# Patient Record
Sex: MALE | Race: WHITE | NOT HISPANIC OR LATINO | Employment: OTHER | URBAN - NONMETROPOLITAN AREA
[De-identification: names, ages, dates, MRNs, and addresses within clinical notes are randomized per-mention and may not be internally consistent; named-entity substitution may affect disease eponyms.]

---

## 2023-09-15 PROBLEM — E78.5 HYPERLIPIDEMIA: Status: ACTIVE | Noted: 2023-09-15

## 2023-09-15 PROBLEM — E66.9 CLASS 1 OBESITY WITH BODY MASS INDEX (BMI) OF 32.0 TO 32.9 IN ADULT: Status: ACTIVE | Noted: 2023-09-15

## 2023-09-15 PROBLEM — I10 ESSENTIAL HYPERTENSION, BENIGN: Status: ACTIVE | Noted: 2023-09-15

## 2023-09-15 PROBLEM — E66.811 CLASS 1 OBESITY WITH BODY MASS INDEX (BMI) OF 32.0 TO 32.9 IN ADULT: Status: ACTIVE | Noted: 2023-09-15

## 2023-09-15 PROBLEM — E11.9 DIABETES MELLITUS (MULTI): Status: ACTIVE | Noted: 2023-09-15

## 2023-09-15 PROBLEM — I25.10 ARTERIOSCLEROTIC CARDIOVASCULAR DISEASE: Status: ACTIVE | Noted: 2023-09-15

## 2023-09-15 PROBLEM — E66.9 CLASS 1 OBESITY WITH BODY MASS INDEX (BMI) OF 34.0 TO 34.9 IN ADULT: Status: ACTIVE | Noted: 2023-09-15

## 2023-09-15 PROBLEM — I95.1 ORTHOSTATIC HYPOTENSION: Status: ACTIVE | Noted: 2023-09-15

## 2023-09-15 PROBLEM — I48.0 PAF (PAROXYSMAL ATRIAL FIBRILLATION) (MULTI): Status: ACTIVE | Noted: 2023-09-15

## 2023-09-15 PROBLEM — E66.811 CLASS 1 OBESITY WITH BODY MASS INDEX (BMI) OF 34.0 TO 34.9 IN ADULT: Status: ACTIVE | Noted: 2023-09-15

## 2023-09-15 RX ORDER — GUAIFENESIN 1200 MG
TABLET, EXTENDED RELEASE 12 HR ORAL AS NEEDED
COMMUNITY

## 2023-09-15 RX ORDER — HUMAN INSULIN 100 [IU]/ML
INJECTION, SOLUTION SUBCUTANEOUS
COMMUNITY
Start: 2021-11-12

## 2023-09-15 RX ORDER — INSULIN GLARGINE 100 [IU]/ML
INJECTION, SOLUTION SUBCUTANEOUS
COMMUNITY

## 2023-09-15 RX ORDER — CLOPIDOGREL BISULFATE 75 MG/1
75 TABLET ORAL DAILY
COMMUNITY

## 2023-09-15 RX ORDER — GABAPENTIN 600 MG/1
600 TABLET ORAL 4 TIMES DAILY
COMMUNITY
Start: 2021-08-25

## 2023-09-15 RX ORDER — IBUPROFEN 200 MG
TABLET ORAL AS NEEDED
COMMUNITY

## 2023-09-15 RX ORDER — MIDODRINE HYDROCHLORIDE 10 MG/1
1 TABLET ORAL 3 TIMES DAILY
COMMUNITY
Start: 2021-12-01

## 2023-09-15 RX ORDER — ATORVASTATIN CALCIUM 40 MG/1
1 TABLET, FILM COATED ORAL NIGHTLY
COMMUNITY
Start: 2021-10-20

## 2023-09-15 RX ORDER — ASPIRIN 81 MG/1
1 TABLET ORAL DAILY
COMMUNITY

## 2023-09-15 RX ORDER — INSULIN DEGLUDEC 200 U/ML
INJECTION, SOLUTION SUBCUTANEOUS
COMMUNITY

## 2023-09-15 RX ORDER — PREGABALIN 75 MG/1
1 CAPSULE ORAL NIGHTLY
COMMUNITY

## 2023-09-15 RX ORDER — AMIODARONE HYDROCHLORIDE 200 MG/1
1 TABLET ORAL DAILY
COMMUNITY
Start: 2021-11-03

## 2023-10-17 ENCOUNTER — APPOINTMENT (OUTPATIENT)
Dept: CARDIOLOGY | Facility: CLINIC | Age: 73
End: 2023-10-17
Payer: MEDICARE

## 2023-11-13 ENCOUNTER — APPOINTMENT (OUTPATIENT)
Dept: CARDIOLOGY | Facility: CLINIC | Age: 73
End: 2023-11-13
Payer: MEDICARE

## 2023-11-21 ENCOUNTER — OFFICE VISIT (OUTPATIENT)
Dept: CARDIOLOGY | Facility: CLINIC | Age: 73
End: 2023-11-21
Payer: MEDICARE

## 2023-11-21 VITALS
BODY MASS INDEX: 33.05 KG/M2 | HEART RATE: 76 BPM | SYSTOLIC BLOOD PRESSURE: 136 MMHG | DIASTOLIC BLOOD PRESSURE: 80 MMHG | HEIGHT: 72 IN | WEIGHT: 244 LBS

## 2023-11-21 DIAGNOSIS — Z79.4 TYPE 2 DIABETES MELLITUS WITH DIABETIC POLYNEUROPATHY, WITH LONG-TERM CURRENT USE OF INSULIN (MULTI): ICD-10-CM

## 2023-11-21 DIAGNOSIS — E78.2 MIXED HYPERLIPIDEMIA: ICD-10-CM

## 2023-11-21 DIAGNOSIS — I48.0 PAF (PAROXYSMAL ATRIAL FIBRILLATION) (MULTI): ICD-10-CM

## 2023-11-21 DIAGNOSIS — E66.09 CLASS 1 OBESITY DUE TO EXCESS CALORIES WITH SERIOUS COMORBIDITY AND BODY MASS INDEX (BMI) OF 34.0 TO 34.9 IN ADULT: ICD-10-CM

## 2023-11-21 DIAGNOSIS — Z79.899 HIGH RISK MEDICATION USE: Primary | ICD-10-CM

## 2023-11-21 DIAGNOSIS — E11.42 TYPE 2 DIABETES MELLITUS WITH DIABETIC POLYNEUROPATHY, WITH LONG-TERM CURRENT USE OF INSULIN (MULTI): ICD-10-CM

## 2023-11-21 DIAGNOSIS — Z95.2 AORTIC VALVE REPLACED: ICD-10-CM

## 2023-11-21 DIAGNOSIS — I25.10 ARTERIOSCLEROTIC CARDIOVASCULAR DISEASE: ICD-10-CM

## 2023-11-21 PROCEDURE — 1159F MED LIST DOCD IN RCRD: CPT | Performed by: NURSE PRACTITIONER

## 2023-11-21 PROCEDURE — 3075F SYST BP GE 130 - 139MM HG: CPT | Performed by: NURSE PRACTITIONER

## 2023-11-21 PROCEDURE — 1160F RVW MEDS BY RX/DR IN RCRD: CPT | Performed by: NURSE PRACTITIONER

## 2023-11-21 PROCEDURE — 3008F BODY MASS INDEX DOCD: CPT | Performed by: NURSE PRACTITIONER

## 2023-11-21 PROCEDURE — 1036F TOBACCO NON-USER: CPT | Performed by: NURSE PRACTITIONER

## 2023-11-21 PROCEDURE — 3079F DIAST BP 80-89 MM HG: CPT | Performed by: NURSE PRACTITIONER

## 2023-11-21 PROCEDURE — 93000 ELECTROCARDIOGRAM COMPLETE: CPT | Performed by: NURSE PRACTITIONER

## 2023-11-21 PROCEDURE — 99213 OFFICE O/P EST LOW 20 MIN: CPT | Performed by: NURSE PRACTITIONER

## 2023-11-21 NOTE — PATIENT INSTRUCTIONS
Please bring all medicines, vitamins, and herbal supplements with you when you come to the office.    Prescriptions will not be filled unless you are compliant with your follow up appointments or have a follow up appointment scheduled as per instruction of your physician. Refills should be requested at the time of your visit.    Fall Prevention Education Given  EKG done in office today    PLAN:   Through informed decision making process incorporating patients unique circumstances, the following treatment plan will be initiated:    1.  Prescription drug management of cardiovascular medication for efficacy, adherence to treatment, side effect assessment and polypharmacy. Current treatment clinically warranted and to continue without modifications.    2. Return for follow-up; in the interim, contact the office if new symptoms arise.  Dr. Tadeo 9 months

## 2023-11-22 PROBLEM — R93.1 ABNORMAL ECHOCARDIOGRAM: Status: ACTIVE | Noted: 2023-11-22

## 2023-11-22 ASSESSMENT — ENCOUNTER SYMPTOMS
NEAR-SYNCOPE: 0
PALPITATIONS: 0
DYSPNEA ON EXERTION: 0
IRREGULAR HEARTBEAT: 0
ORTHOPNEA: 0
SYNCOPE: 0
PND: 0
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
## 2023-11-22 NOTE — ASSESSMENT & PLAN NOTE
Amiodarone  Surveillance testing completed March 2023  QTc in office 524 with underlying right bundle branch block consistent with history

## 2023-11-22 NOTE — ASSESSMENT & PLAN NOTE
2007 coronary bypass graft  BRITO to LAD  RCA 10%  Circumflex 10%    Current daily activity less than 4 METS without recurrent symptoms

## 2023-11-22 NOTE — ASSESSMENT & PLAN NOTE
2007 aortic valve replacement #25 Alvarez Perimount bioprosthetic valve due to bicuspid valve.    Last evaluated October 2022 TTE with peak 35 mean 24

## 2023-11-29 ENCOUNTER — APPOINTMENT (OUTPATIENT)
Dept: CARDIOLOGY | Facility: CLINIC | Age: 73
End: 2023-11-29
Payer: MEDICARE

## 2024-01-11 ENCOUNTER — TELEPHONE (OUTPATIENT)
Dept: CARDIOLOGY | Facility: CLINIC | Age: 74
End: 2024-01-11
Payer: MEDICARE

## 2024-01-11 NOTE — TELEPHONE ENCOUNTER
Patient was due for Amiodarone testing at [facility] in 9/2023. Nothing noted. Attempted to phone patient. No answer.  Unable to leave message.

## 2024-08-07 ENCOUNTER — APPOINTMENT (OUTPATIENT)
Dept: CARDIOLOGY | Facility: CLINIC | Age: 74
End: 2024-08-07
Payer: MEDICARE

## 2024-08-29 PROBLEM — E78.5 HYPERLIPIDEMIA: Status: ACTIVE | Noted: 2024-08-29

## 2024-08-29 PROBLEM — E11.621 TYPE 2 DIABETES MELLITUS WITH FOOT ULCER (HCC): Status: ACTIVE | Noted: 2024-08-29

## 2024-08-29 PROBLEM — I25.10 ASHD (ARTERIOSCLEROTIC HEART DISEASE): Status: ACTIVE | Noted: 2024-08-29

## 2024-08-29 PROBLEM — I10 HYPERTENSION: Status: ACTIVE | Noted: 2024-08-29

## 2024-08-29 PROBLEM — L97.509 TYPE 2 DIABETES MELLITUS WITH FOOT ULCER (HCC): Status: ACTIVE | Noted: 2024-08-29

## 2024-08-29 PROBLEM — N18.30 CHRONIC KIDNEY DISEASE (CKD), ACTIVE MEDICAL MANAGEMENT WITHOUT DIALYSIS, STAGE 3 (MODERATE) (HCC): Status: ACTIVE | Noted: 2024-08-29

## 2024-08-29 PROBLEM — I48.91 ATRIAL FIBRILLATION (HCC): Status: ACTIVE | Noted: 2024-08-29

## 2024-08-29 PROBLEM — K59.00 CONSTIPATION: Status: ACTIVE | Noted: 2024-08-29

## 2024-08-29 PROBLEM — F32.A DEPRESSION: Status: ACTIVE | Noted: 2024-08-29

## 2024-08-29 RX ORDER — CLOPIDOGREL BISULFATE 75 MG/1
75 TABLET ORAL DAILY
COMMUNITY

## 2024-08-29 RX ORDER — ACETAMINOPHEN 325 MG/1
650 TABLET ORAL EVERY 4 HOURS PRN
COMMUNITY

## 2024-08-29 RX ORDER — SERTRALINE HYDROCHLORIDE 25 MG/1
25 TABLET, FILM COATED ORAL DAILY
COMMUNITY

## 2024-08-29 RX ORDER — ATORVASTATIN CALCIUM 80 MG/1
80 TABLET, FILM COATED ORAL NIGHTLY
COMMUNITY

## 2024-08-29 RX ORDER — AMIODARONE HYDROCHLORIDE 200 MG/1
200 TABLET ORAL DAILY
COMMUNITY

## 2024-08-29 RX ORDER — ASPIRIN 81 MG/1
81 TABLET, CHEWABLE ORAL DAILY
COMMUNITY

## 2024-08-29 RX ORDER — GABAPENTIN 600 MG/1
600 TABLET ORAL 2 TIMES DAILY
COMMUNITY

## 2024-08-30 ENCOUNTER — OFFICE VISIT (OUTPATIENT)
Dept: GERIATRIC MEDICINE | Age: 74
End: 2024-08-30

## 2024-08-30 DIAGNOSIS — E11.621 TYPE 2 DIABETES MELLITUS WITH FOOT ULCER, WITHOUT LONG-TERM CURRENT USE OF INSULIN (HCC): ICD-10-CM

## 2024-08-30 DIAGNOSIS — I10 HYPERTENSION, UNSPECIFIED TYPE: ICD-10-CM

## 2024-08-30 DIAGNOSIS — F03.90 DEMENTIA, UNSPECIFIED DEMENTIA SEVERITY, UNSPECIFIED DEMENTIA TYPE, UNSPECIFIED WHETHER BEHAVIORAL, PSYCHOTIC, OR MOOD DISTURBANCE OR ANXIETY (HCC): Primary | ICD-10-CM

## 2024-08-30 DIAGNOSIS — N18.30 CHRONIC KIDNEY DISEASE (CKD), ACTIVE MEDICAL MANAGEMENT WITHOUT DIALYSIS, STAGE 3 (MODERATE) (HCC): ICD-10-CM

## 2024-08-30 DIAGNOSIS — I48.91 ATRIAL FIBRILLATION, UNSPECIFIED TYPE (HCC): ICD-10-CM

## 2024-08-30 DIAGNOSIS — E78.5 HYPERLIPIDEMIA, UNSPECIFIED HYPERLIPIDEMIA TYPE: ICD-10-CM

## 2024-08-30 DIAGNOSIS — F32.A DEPRESSION, UNSPECIFIED DEPRESSION TYPE: ICD-10-CM

## 2024-08-30 DIAGNOSIS — L97.509 TYPE 2 DIABETES MELLITUS WITH FOOT ULCER, WITHOUT LONG-TERM CURRENT USE OF INSULIN (HCC): ICD-10-CM

## 2024-09-02 NOTE — PROGRESS NOTES
History and Physical      CHIEF COMPLAINT:  dementia     History of Present Illness:      A 73 y.o. male who is being seen at [facility] for dementia and DM. He is sitting in his bed. He states no issues at this time.     REVIEW OF SYSTEMS:  A complete 10 Point review of systems was preformed and negative unless previously stated      PMH:  CKD stage 3   HTN   A fib   HLD   Depression     Surgical History:  Unknown per patient.     Medications Prior to Admission:    Prior to Admission medications    Medication Sig Start Date End Date Taking? Authorizing Provider   amiodarone (CORDARONE) 200 MG tablet Take 1 tablet by mouth daily Indications: Atrial Fibrillation    ProviderJerel MD   aspirin 81 MG chewable tablet Take 1 tablet by mouth daily Indications: Thickening and Hardening of Heart Arteries    ProviderJerel MD   atorvastatin (LIPITOR) 80 MG tablet Take 1 tablet by mouth at bedtime Indications: High Amount of Fats in the Blood    ProviderJerel MD   clopidogrel (PLAVIX) 75 MG tablet Take 1 tablet by mouth daily Indications: Treatment to Reduce Platelet Aggregation    ProviderJerel MD   gabapentin (NEURONTIN) 600 MG tablet Take 1 tablet by mouth 2 times daily. Indications: Diabetes with Nerve Disease    ProviderJerel MD   empagliflozin (JARDIANCE) 25 MG tablet Take 1 tablet by mouth daily Indications: Type 2 Diabetes    ProviderJerel MD   sertraline (ZOLOFT) 25 MG tablet Take 1 tablet by mouth daily Indications: Agitation, Depression    ProviderJerel MD   diclofenac sodium (VOLTAREN) 1 % GEL Apply 2 g topically 4 times daily as needed for Pain (Ribs & back)    Jerel Jorge MD   acetaminophen (TYLENOL) 325 MG tablet Take 2 tablets by mouth every 4 hours as needed for Pain or Fever    ProviderJerel MD       Allergies:    Patient has no known allergies.    Social History:    Denies smoking, drugs, etoh     Family History:   Cardiac

## 2024-09-04 ENCOUNTER — APPOINTMENT (OUTPATIENT)
Dept: CARDIOLOGY | Facility: CLINIC | Age: 74
End: 2024-09-04
Payer: MEDICARE

## 2024-09-09 ENCOUNTER — OFFICE VISIT (OUTPATIENT)
Dept: GERIATRIC MEDICINE | Age: 74
End: 2024-09-09

## 2024-09-09 DIAGNOSIS — E11.621 TYPE 2 DIABETES MELLITUS WITH FOOT ULCER, WITHOUT LONG-TERM CURRENT USE OF INSULIN (HCC): Primary | ICD-10-CM

## 2024-09-09 DIAGNOSIS — I10 HYPERTENSION, UNSPECIFIED TYPE: ICD-10-CM

## 2024-09-09 DIAGNOSIS — L97.509 TYPE 2 DIABETES MELLITUS WITH FOOT ULCER, WITHOUT LONG-TERM CURRENT USE OF INSULIN (HCC): Primary | ICD-10-CM

## 2024-09-09 DIAGNOSIS — I48.91 ATRIAL FIBRILLATION, UNSPECIFIED TYPE (HCC): ICD-10-CM

## 2024-09-10 ENCOUNTER — APPOINTMENT (OUTPATIENT)
Dept: CARDIOLOGY | Facility: CLINIC | Age: 74
End: 2024-09-10
Payer: MEDICARE

## 2024-09-12 ENCOUNTER — OFFICE VISIT (OUTPATIENT)
Dept: GERIATRIC MEDICINE | Age: 74
End: 2024-09-12
Payer: MEDICARE

## 2024-09-12 DIAGNOSIS — H61.23 BILATERAL IMPACTED CERUMEN: Primary | ICD-10-CM

## 2024-09-12 PROCEDURE — 99309 SBSQ NF CARE MODERATE MDM 30: CPT | Performed by: PHYSICIAN ASSISTANT

## 2024-09-12 PROCEDURE — 1123F ACP DISCUSS/DSCN MKR DOCD: CPT | Performed by: PHYSICIAN ASSISTANT

## 2024-10-08 NOTE — PROGRESS NOTES
Subjective:      Patient ID: Agustin Kimball is a pleasant 73 y.o. male who presents today for:  No chief complaint on file.      [facility]  For cerumen buildup in bilateral ears.  There is significant buildup of cerumen, not impacted to bilateral ears.  Patient stating he is having difficulty with hearing.  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  States he does have hearing aids but none in at the moment.  Will plan on doing Debrox drops and warm water flush after 4 days.  Monitor progress status post.      Patient Active Problem List   Diagnosis    Type 2 diabetes mellitus with foot ulcer (HCC)    Hypertension    Chronic kidney disease (CKD), active medical management without dialysis, stage 3 (moderate) (HCC)    Atrial fibrillation (HCC)    ASHD (arteriosclerotic heart disease)    Depression    Constipation    Hyperlipidemia     No past medical history on file.  No past surgical history on file.  Social History     Socioeconomic History    Marital status: Unknown     Spouse name: Not on file    Number of children: Not on file    Years of education: Not on file    Highest education level: Not on file   Occupational History    Not on file   Tobacco Use    Smoking status: Not on file    Smokeless tobacco: Not on file   Substance and Sexual Activity    Alcohol use: Not on file    Drug use: Not on file    Sexual activity: Not on file   Other Topics Concern    Not on file   Social History Narrative    Not on file     Social Determinants of Health     Financial Resource Strain: Not on file   Food Insecurity: Not on file   Transportation Needs: Not on file   Physical Activity: Not on file   Stress: Not on file   Social Connections: Not on file   Intimate Partner Violence: Not on file   Housing Stability: Not on file     No family history on file.  No Known Allergies        Review of Systems   HENT:  Positive for hearing loss. Negative for

## 2024-10-24 ENCOUNTER — OFFICE VISIT (OUTPATIENT)
Dept: GERIATRIC MEDICINE | Age: 74
End: 2024-10-24

## 2024-10-24 DIAGNOSIS — N18.30 CHRONIC KIDNEY DISEASE (CKD), ACTIVE MEDICAL MANAGEMENT WITHOUT DIALYSIS, STAGE 3 (MODERATE) (HCC): ICD-10-CM

## 2024-10-24 DIAGNOSIS — R62.7 FTT (FAILURE TO THRIVE) IN ADULT: Primary | ICD-10-CM

## 2024-10-24 DIAGNOSIS — I48.91 ATRIAL FIBRILLATION, UNSPECIFIED TYPE (HCC): ICD-10-CM

## 2024-10-24 DIAGNOSIS — I25.10 ASHD (ARTERIOSCLEROTIC HEART DISEASE): ICD-10-CM

## 2024-10-28 ENCOUNTER — APPOINTMENT (OUTPATIENT)
Dept: CARDIOLOGY | Facility: CLINIC | Age: 74
End: 2024-10-28
Payer: MEDICARE

## 2024-10-31 ENCOUNTER — OFFICE VISIT (OUTPATIENT)
Dept: GERIATRIC MEDICINE | Age: 74
End: 2024-10-31

## 2024-10-31 DIAGNOSIS — E11.42 TYPE 2 DIABETES MELLITUS WITH DIABETIC POLYNEUROPATHY, WITH LONG-TERM CURRENT USE OF INSULIN (HCC): Primary | ICD-10-CM

## 2024-10-31 DIAGNOSIS — Z79.4 TYPE 2 DIABETES MELLITUS WITH DIABETIC POLYNEUROPATHY, WITH LONG-TERM CURRENT USE OF INSULIN (HCC): Primary | ICD-10-CM

## 2024-11-07 ENCOUNTER — OFFICE VISIT (OUTPATIENT)
Dept: GERIATRIC MEDICINE | Age: 74
End: 2024-11-07

## 2024-11-07 DIAGNOSIS — K59.09 OTHER CONSTIPATION: Primary | ICD-10-CM

## 2024-11-08 ENCOUNTER — OFFICE VISIT (OUTPATIENT)
Dept: GERIATRIC MEDICINE | Age: 74
End: 2024-11-08

## 2024-11-08 DIAGNOSIS — N18.30 CHRONIC KIDNEY DISEASE (CKD), ACTIVE MEDICAL MANAGEMENT WITHOUT DIALYSIS, STAGE 3 (MODERATE) (HCC): Primary | ICD-10-CM

## 2024-11-08 DIAGNOSIS — I10 HYPERTENSION, UNSPECIFIED TYPE: ICD-10-CM

## 2024-11-08 DIAGNOSIS — I48.91 ATRIAL FIBRILLATION, UNSPECIFIED TYPE (HCC): ICD-10-CM

## 2024-11-08 DIAGNOSIS — Z79.4 TYPE 2 DIABETES MELLITUS WITH DIABETIC POLYNEUROPATHY, WITH LONG-TERM CURRENT USE OF INSULIN (HCC): ICD-10-CM

## 2024-11-08 DIAGNOSIS — E11.42 TYPE 2 DIABETES MELLITUS WITH DIABETIC POLYNEUROPATHY, WITH LONG-TERM CURRENT USE OF INSULIN (HCC): ICD-10-CM

## 2024-11-14 ENCOUNTER — OFFICE VISIT (OUTPATIENT)
Dept: GERIATRIC MEDICINE | Age: 74
End: 2024-11-14

## 2024-11-14 DIAGNOSIS — E11.65 HYPERGLYCEMIA DUE TO DIABETES MELLITUS (HCC): Primary | ICD-10-CM

## 2024-11-23 PROBLEM — M54.9 BACKACHE: Status: ACTIVE | Noted: 2024-11-23

## 2024-11-23 PROBLEM — E11.42 TYPE 2 DIABETES MELLITUS WITH DIABETIC POLYNEUROPATHY, WITH LONG-TERM CURRENT USE OF INSULIN (HCC): Status: ACTIVE | Noted: 2023-09-15

## 2024-11-23 PROBLEM — Z79.4 TYPE 2 DIABETES MELLITUS WITH DIABETIC POLYNEUROPATHY, WITH LONG-TERM CURRENT USE OF INSULIN (HCC): Status: ACTIVE | Noted: 2023-09-15

## 2024-11-23 PROBLEM — I25.10 ARTERIOSCLEROTIC CARDIOVASCULAR DISEASE: Status: ACTIVE | Noted: 2023-09-15

## 2024-11-23 PROBLEM — I48.0 PAF (PAROXYSMAL ATRIAL FIBRILLATION) (HCC): Status: ACTIVE | Noted: 2023-09-15

## 2024-11-30 ASSESSMENT — ENCOUNTER SYMPTOMS
SHORTNESS OF BREATH: 0
COUGH: 0

## 2024-11-30 NOTE — PROGRESS NOTES
glipizide 2.5 mg p.o. daily plus Accu-Cheks q. meal.  A1c in 6 weeks.  Follow-up for any additional dose changes needed for adequate coverage.    No follow-ups on file.      SHIRLEY Aguilar    Electronically signed by: SHIRLEY Aguilar on 11/30/2024    Please note orders entered on site at facility after discussion with appropriate facility nursing/therapy/ / nutritional staff. Current longstanding medical problems and acute medical issues addressed with staff. Available data and data elements in on site paper chart reviewed and analyzed.  Current external consultant notes reviewed in on site chart. Ordered laboratory testing and imaging will be reviewed when available.     Side effects, adverse effects of the medication prescribed today, as well as treatment plan and result expectations have been discussed withthe patient who expresses understanding and desires to proceed.    I spent a total of 15 minutes on the date of service which included preparing to see the patient, face-to-face patient care, discussion with nurse about current condition/care concerns, performing a medically appropriate examination, completing clinical documentation, and on counseling/ educating the patient and/or family.      Please note Nuance Dragon PowerMic III software used for dictation of note,  which may contain minor errors due to ambient noise and indiscriminate speech pickup.

## 2024-12-07 NOTE — PROGRESS NOTES
SUBJECTIVE:        ROS:  The rest of the 14 point ROS negative    PHYSICAL EXAM: VSS per facility record      ASSESSMENT & PLAN:   Diagnosis Orders   1. Chronic kidney disease (CKD), active medical management without dialysis, stage 3 (moderate) ([facility])        2. Hypertension, unspecified type        3. Atrial fibrillation, unspecified type ([facility])        4. Type 2 diabetes mellitus with diabetic polyneuropathy, with long-term current use of insulin ([facility])                      No past medical history on file.      No past surgical history on file.      Current Outpatient Medications on File Prior to Visit   Medication Sig Dispense Refill    amiodarone (CORDARONE) 200 MG tablet Take 1 tablet by mouth daily Indications: Atrial Fibrillation      aspirin 81 MG chewable tablet Take 1 tablet by mouth daily Indications: Thickening and Hardening of Heart Arteries      atorvastatin (LIPITOR) 80 MG tablet Take 1 tablet by mouth at bedtime Indications: High Amount of Fats in the Blood      clopidogrel (PLAVIX) 75 MG tablet Take 1 tablet by mouth daily Indications: Treatment to Reduce Platelet Aggregation      gabapentin (NEURONTIN) 600 MG tablet Take 1 tablet by mouth 2 times daily. Indications: Diabetes with Nerve Disease      empagliflozin (JARDIANCE) 25 MG tablet Take 1 tablet by mouth daily Indications: Type 2 Diabetes      sertraline (ZOLOFT) 25 MG tablet Take 1 tablet by mouth daily Indications: Agitation, Depression      diclofenac sodium (VOLTAREN) 1 % GEL Apply 2 g topically 4 times daily as needed for Pain (Ribs & back)      acetaminophen (TYLENOL) 325 MG tablet Take 2 tablets by mouth every 4 hours as needed for Pain or Fever       No current facility-administered medications on file prior to visit.         No family history on file.    Social History     Socioeconomic History    Marital status: Unknown     Spouse name: Not on file    Number of children: Not on file    Years of education: Not on file    Highest education

## 2024-12-11 ENCOUNTER — OFFICE VISIT (OUTPATIENT)
Dept: GERIATRIC MEDICINE | Age: 74
End: 2024-12-11
Payer: MEDICARE

## 2024-12-11 DIAGNOSIS — Z79.4 TYPE 2 DIABETES MELLITUS WITH DIABETIC POLYNEUROPATHY, WITH LONG-TERM CURRENT USE OF INSULIN (HCC): ICD-10-CM

## 2024-12-11 DIAGNOSIS — N18.30 CHRONIC KIDNEY DISEASE (CKD), ACTIVE MEDICAL MANAGEMENT WITHOUT DIALYSIS, STAGE 3 (MODERATE) (HCC): Primary | ICD-10-CM

## 2024-12-11 DIAGNOSIS — E11.42 TYPE 2 DIABETES MELLITUS WITH DIABETIC POLYNEUROPATHY, WITH LONG-TERM CURRENT USE OF INSULIN (HCC): ICD-10-CM

## 2024-12-11 DIAGNOSIS — I10 HYPERTENSION, UNSPECIFIED TYPE: ICD-10-CM

## 2024-12-11 PROCEDURE — 1123F ACP DISCUSS/DSCN MKR DOCD: CPT | Performed by: INTERNAL MEDICINE

## 2024-12-11 PROCEDURE — 3046F HEMOGLOBIN A1C LEVEL >9.0%: CPT | Performed by: INTERNAL MEDICINE

## 2024-12-11 PROCEDURE — 99309 SBSQ NF CARE MODERATE MDM 30: CPT | Performed by: INTERNAL MEDICINE

## 2024-12-11 PROCEDURE — G8484 FLU IMMUNIZE NO ADMIN: HCPCS | Performed by: INTERNAL MEDICINE

## 2024-12-12 ENCOUNTER — OFFICE VISIT (OUTPATIENT)
Dept: GERIATRIC MEDICINE | Age: 74
End: 2024-12-12

## 2024-12-12 DIAGNOSIS — E87.6 HYPOKALEMIA: ICD-10-CM

## 2024-12-12 DIAGNOSIS — N18.30 CHRONIC KIDNEY DISEASE (CKD), ACTIVE MEDICAL MANAGEMENT WITHOUT DIALYSIS, STAGE 3 (MODERATE) (HCC): ICD-10-CM

## 2024-12-12 DIAGNOSIS — Z79.4 TYPE 2 DIABETES MELLITUS WITH DIABETIC POLYNEUROPATHY, WITH LONG-TERM CURRENT USE OF INSULIN (HCC): ICD-10-CM

## 2024-12-12 DIAGNOSIS — E11.42 TYPE 2 DIABETES MELLITUS WITH DIABETIC POLYNEUROPATHY, WITH LONG-TERM CURRENT USE OF INSULIN (HCC): ICD-10-CM

## 2024-12-12 DIAGNOSIS — I48.0 PAF (PAROXYSMAL ATRIAL FIBRILLATION) (HCC): Primary | ICD-10-CM

## 2024-12-17 ENCOUNTER — APPOINTMENT (OUTPATIENT)
Dept: CARDIOLOGY | Facility: CLINIC | Age: 74
End: 2024-12-17
Payer: MEDICARE

## 2024-12-31 NOTE — PROGRESS NOTES
SUBJECTIVE:  This 74-year-old gentleman seen follows for chronic disease hypertension diabetes globally weak no recent A1c refills coughing intermittently notes no hypoglycemia globally weak no acute bleeding diathesis      ROS: Denies chest  The rest of the 14 point ROS negative    PHYSICAL EXAM: VSS per facility record  Pupils are small and reactive oral mucosas moist chest showed-breath sounds cardiovascular showed a regular abdomen soft tender no rash    ASSESSMENT & PLAN:   Diagnosis Orders   1. Chronic kidney disease (CKD), active medical management without dialysis, stage 3 (moderate) ([facility])        2. Hypertension, unspecified type        3. Type 2 diabetes mellitus with diabetic polyneuropathy, with long-term current use of insulin ([facility])            Notes no hypoglycemia function stable globally weak and has history of neuropathy on gabapentin tolerating well.  Remains on amiodarone functionally we can monitor renal function repeat TSH is pending          No past medical history on file.      No past surgical history on file.      Current Outpatient Medications on File Prior to Visit   Medication Sig Dispense Refill    amiodarone (CORDARONE) 200 MG tablet Take 1 tablet by mouth daily Indications: Atrial Fibrillation      aspirin 81 MG chewable tablet Take 1 tablet by mouth daily Indications: Thickening and Hardening of Heart Arteries      atorvastatin (LIPITOR) 80 MG tablet Take 1 tablet by mouth at bedtime Indications: High Amount of Fats in the Blood      clopidogrel (PLAVIX) 75 MG tablet Take 1 tablet by mouth daily Indications: Treatment to Reduce Platelet Aggregation      gabapentin (NEURONTIN) 600 MG tablet Take 1 tablet by mouth 2 times daily. Indications: Diabetes with Nerve Disease      empagliflozin (JARDIANCE) 25 MG tablet Take 1 tablet by mouth daily Indications: Type 2 Diabetes      sertraline (ZOLOFT) 25 MG tablet Take 1 tablet by mouth daily Indications: Agitation, Depression      diclofenac

## 2025-01-04 ASSESSMENT — ENCOUNTER SYMPTOMS
SHORTNESS OF BREATH: 0
CONSTIPATION: 1
COUGH: 0
ABDOMINAL PAIN: 0

## 2025-02-04 ENCOUNTER — APPOINTMENT (OUTPATIENT)
Dept: CARDIOLOGY | Facility: CLINIC | Age: 75
End: 2025-02-04
Payer: MEDICARE

## 2025-02-04 VITALS
WEIGHT: 225 LBS | HEIGHT: 72 IN | HEART RATE: 59 BPM | SYSTOLIC BLOOD PRESSURE: 132 MMHG | DIASTOLIC BLOOD PRESSURE: 86 MMHG | BODY MASS INDEX: 30.48 KG/M2

## 2025-02-04 DIAGNOSIS — I95.1 ORTHOSTATIC HYPOTENSION: ICD-10-CM

## 2025-02-04 DIAGNOSIS — Z78.9 NURSING HOME RESIDENT: ICD-10-CM

## 2025-02-04 DIAGNOSIS — Z95.2 AORTIC VALVE REPLACED: ICD-10-CM

## 2025-02-04 DIAGNOSIS — I48.0 PAF (PAROXYSMAL ATRIAL FIBRILLATION) (MULTI): ICD-10-CM

## 2025-02-04 DIAGNOSIS — I25.10 ARTERIOSCLEROTIC CARDIOVASCULAR DISEASE: ICD-10-CM

## 2025-02-04 DIAGNOSIS — R93.1 ABNORMAL ECHOCARDIOGRAM: ICD-10-CM

## 2025-02-04 DIAGNOSIS — E78.2 MIXED HYPERLIPIDEMIA: ICD-10-CM

## 2025-02-04 DIAGNOSIS — Z79.899 HIGH RISK MEDICATION USE: Primary | ICD-10-CM

## 2025-02-04 PROCEDURE — 99214 OFFICE O/P EST MOD 30 MIN: CPT | Performed by: NURSE PRACTITIONER

## 2025-02-04 PROCEDURE — 1036F TOBACCO NON-USER: CPT | Performed by: NURSE PRACTITIONER

## 2025-02-04 PROCEDURE — 3079F DIAST BP 80-89 MM HG: CPT | Performed by: NURSE PRACTITIONER

## 2025-02-04 PROCEDURE — 93000 ELECTROCARDIOGRAM COMPLETE: CPT | Performed by: NURSE PRACTITIONER

## 2025-02-04 PROCEDURE — 1159F MED LIST DOCD IN RCRD: CPT | Performed by: NURSE PRACTITIONER

## 2025-02-04 PROCEDURE — 1160F RVW MEDS BY RX/DR IN RCRD: CPT | Performed by: NURSE PRACTITIONER

## 2025-02-04 PROCEDURE — 3008F BODY MASS INDEX DOCD: CPT | Performed by: NURSE PRACTITIONER

## 2025-02-04 PROCEDURE — 3075F SYST BP GE 130 - 139MM HG: CPT | Performed by: NURSE PRACTITIONER

## 2025-02-04 RX ORDER — SERTRALINE HYDROCHLORIDE 25 MG/1
25 TABLET, FILM COATED ORAL DAILY
COMMUNITY

## 2025-02-04 RX ORDER — ATORVASTATIN CALCIUM 80 MG/1
80 TABLET, FILM COATED ORAL DAILY
COMMUNITY

## 2025-02-04 RX ORDER — GLIPIZIDE 2.5 MG/1
2.5 TABLET, EXTENDED RELEASE ORAL DAILY
COMMUNITY

## 2025-02-04 RX ORDER — AMOXICILLIN 250 MG
1 CAPSULE ORAL DAILY
COMMUNITY

## 2025-02-04 ASSESSMENT — ENCOUNTER SYMPTOMS
IRREGULAR HEARTBEAT: 0
SHORTNESS OF BREATH: 1
NEAR-SYNCOPE: 0
PALPITATIONS: 0
DYSPNEA ON EXERTION: 0
PND: 0
SYNCOPE: 0
ORTHOPNEA: 0

## 2025-02-04 NOTE — PROGRESS NOTES
"Chief Complaint  \" Guess I am pretty decent\"    Reason for Visit  Annual follow-up  Patient presents to the office today for outpatient follow-up for coronary artery disease, A-fib, high risk med use.    Last evaluated in clinic by myself November 2023.    Presents today in motorized wheelchair.  He is bed ridden/wheelchair-bound at nursing home.  Accompanied by spouse    Wife reports a mechanical fall in December 2023 with hospitalization and discharged to nursing home.  He is now a resident at a local facility where he is bed ridden, reportedly gets up into the wheelchair for the majority of the day.    History of Present Illness   Patient is a pleasant 74-year-old gentleman who presents to the office today where he reports noticing some shortness of breath with conversation and feeling like he has\" no lung capacity\".  He sleeps with head of bed at 30 degrees.  There is no evidence of edema.  Daily activity is less than 4 METS.  At the nursing facility there have been no recurrent falls.  I believe he is wheelchair-bound due to diabetic neuropathy.  He denies any palpitations.  No PND.  On exam his lungs are clear, there is no evidence of progression of aortic stenosis murmur.  There is no noted dyspnea with conversation today.  Question if related to anxiety versus deconditioning.  Nonetheless, he is due for amiodarone surveillance testing and we will just review chest x-ray.    Patient reports that overall has no complaint(s) of chest pain, chest pressure/discomfort, claudication, exertional chest pressure/discomfort, fatigue, and irregular heart beat    Daily activity:    Less than 4 METS  Denies any change in exercise capacity or functional tolerance since last office visit.    The importance of secondary prevention reviewed:  HTN: Optimal  HLD: Treated  DM: Treated, unknown hemoglobin A1c  Smoker: Denies  BMI:  Reviewed the merits of healthy lifestyle choices on overall cardiovascular health.    Fall " screening reveals an occurrence over the course of the last year. Events were reviewed in detail with the patient and due to mechanical.     Conservative management.   EKG sinus rhythm with first-degree AV block, QTc 528.  This is consistent with prior EKGs.    Review of Systems   Cardiovascular:  Negative for chest pain, dyspnea on exertion, irregular heartbeat, leg swelling, near-syncope, orthopnea, palpitations, paroxysmal nocturnal dyspnea and syncope.   Respiratory:  Positive for shortness of breath.         Visit Vitals  /86 (BP Location: Right arm, Patient Position: Sitting)   Pulse 59   Ht 1.829 m (6')   Wt 102 kg (225 lb) Comment: last weight in   BMI 30.52 kg/m²   Smoking Status Never   BSA 2.28 m²     Physical Exam  Vitals and nursing note reviewed.   Constitutional:       Appearance: Normal appearance. He is obese.   Cardiovascular:      Rate and Rhythm: Normal rate and regular rhythm.      Heart sounds: Normal heart sounds.   Pulmonary:      Effort: Pulmonary effort is normal.      Breath sounds: Normal breath sounds.   Musculoskeletal:      Cervical back: Full passive range of motion without pain.      Right lower leg: No edema.      Left lower leg: No edema.   Skin:     General: Skin is cool.   Neurological:      Mental Status: He is alert and oriented to person, place, and time.   Psychiatric:         Attention and Perception: Attention normal.         Mood and Affect: Mood normal.         Behavior: Behavior is cooperative.        Allergies   Allergen Reactions    Ace Inhibitors Other     Hypertension, orthostatic     Pollen Extracts Unknown     Current Outpatient Medications   Medication Instructions    acetaminophen (TylenoL) 325 mg capsule As needed    amiodarone (Pacerone) 200 mg tablet 1 tablet, Daily    aspirin 81 mg EC tablet 1 tablet, Daily    atorvastatin (LIPITOR) 80 mg, Daily    clopidogrel (PLAVIX) 75 mg, Daily    empagliflozin (JARDIANCE) 25 mg, Daily    gabapentin (NEURONTIN) 600  mg, 2 times daily    glipiZIDE XL (GLUCOTROL XL) 2.5 mg, Daily    insulin glargine (Lantus U-100 Insulin) 100 unit/mL injection Use as directed    sennosides-docusate sodium (Annamaria-Colace) 8.6-50 mg tablet 1 tablet, Daily    sertraline (ZOLOFT) 25 mg, Daily      Assessment:    High risk medication use  Amiodarone  Surveillance testing completed March 2023  QTc in office 528 with underlying right bundle branch block consistent with history     Will arrange testing    Arteriosclerotic cardiovascular disease  2007 coronary bypass graft  BRITO to LAD  RCA 10%  Circumflex 10%     Current daily activity less than 4 METS without recurrent symptoms       Hyperlipidemia  High intensity statin    Orthostatic hypotension  Previously was maintained on ProAmatine  Presents off medication today.  For the most part he is bedridden and wheelchair-bound at nursing facility.    PAF (paroxysmal atrial fibrillation) ([facility])  EKG in office maintaining normal sinus rhythm on amiodarone  Denies any recurrent palpitations     CHADS VASc 3 with unfavorable risk-benefit ratio to anticoagulation due to frequent falls.  Maintained on DAPT and maintenance of normal sinus rhythm    Aortic valve replaced  2007 aortic valve replacement #25 Alvarez Perimount bioprosthetic valve due to bicuspid valve.     Last evaluated October 2022 TTE with peak 35 mean 24       Abnormal echocardiogram  October 2022 TTE  LVEF 60 to 65%  LVH moderate       BMI 30.0-30.9,adult  Weight down 20 pounds and relates to not liking food at NH    Nursing home resident  DNR-CC    Plan:     Through informed decision making process incorporating patients unique circumstances, the following treatment plan will be initiated:    1.  Prescription drug management of cardiovascular medication for efficacy, adherence to treatment, side effect assessment and polypharmacy. Current treatment clinically warranted and to continue without modifications.    2.  Amiodarone testing    3. Return  for follow-up; in the interim, contact the office if new symptoms arise.  NP 9 months    Angeli Zelaya MSN, APRN-CNP, PMHNP-[facility] [place], [place]     Please excuse any errors in grammar or translation related to this dictation. Voice recognition software was utilized to prepare this document.

## 2025-02-04 NOTE — ASSESSMENT & PLAN NOTE
Previously was maintained on ProAmatine  Presents off medication today.  For the most part he is bedridden and wheelchair-bound at nursing facility.

## 2025-02-04 NOTE — ASSESSMENT & PLAN NOTE
EKG in office maintaining normal sinus rhythm on amiodarone  Denies any recurrent palpitations     CHADS VASc 3 with unfavorable risk-benefit ratio to anticoagulation due to frequent falls.  Maintained on DAPT and maintenance of normal sinus rhythm

## 2025-02-04 NOTE — ASSESSMENT & PLAN NOTE
Amiodarone  Surveillance testing completed March 2023  QTc in office 528 with underlying right bundle branch block consistent with history     Will arrange testing

## 2025-02-04 NOTE — PATIENT INSTRUCTIONS
Please bring all medicines, vitamins, and herbal supplements with you when you come to the office.    Prescriptions will not be filled unless you are compliant with your follow up appointments or have a follow up appointment scheduled as per instruction of your physician. Refills should be requested at the time of your visit.     Fall Prevention Education Given     PLAN:   Through informed decision making process incorporating patients unique circumstances, the following treatment plan will be initiated:    1.  Prescription drug management of cardiovascular medication for efficacy, adherence to treatment, side effect assessment and polypharmacy. Current treatment clinically warranted and to continue without modifications.    2.  Amiodarone testing    3. Return for follow-up; in the interim, contact the office if new symptoms arise.  NP 9 months        Ways to Help Prevent Falls at Home    Quick Tips   ? Ask for help if you need it. Most people want to help!   ? Get up slowly after sitting or laying down   ? Wear a medical alert device or keep cell phone in your pocket   ? Use night lights, especially areas near a bathroom   ? Keep the items you use often within reach on a small stool or end table   ? Use an assistive device such as walker or cane, as directed by provider/physical therapy   ? Use a non-slip mat and grab bars in your bathroom. Look for home health sections for best options     Other Areas to Focus On   ? Exercise and nutrition: Regular exercise or taking a falls prevention class are great ways improve strength and balance. Don’t forget to stay hydrated and bring a snack!   ? Medicine side effects: Some medicines can make you sleepy or dizzy, which could cause a fall. Ask your healthcare provider about the side effects your medicines could cause. Be sure to let them know if you take any vitamins or supplements as well.   ? Tripping hazards: Remove items you could trip on, such as loose mats, rugs,  cords, and clutter. Wear closed toe shoes with rubber soles.   ? Health and wellness: Get regular checkups with your healthcare provider, plus routine vision and hearing screenings. Talk with your healthcare provider about:   o Your medicines and the possible side effects - bring them in a bag if that is easier!   o Problems with balance or feeling dizzy   o Ways to promote bone health, such as Vitamin D and calcium supplements   o Questions or concerns about falling     *Ask your healthcare team if you have questions     ©[facility], 2022

## 2025-02-04 NOTE — LETTER
"February 4, 2025     Maryanne Brandon DO  [address]  [address] [facility] [address]    Patient: Julien Arias   YOB: 1950   Date of Visit: 2/4/2025       Dear Dr. Maryanne Brandon DO:    Thank you for referring Julien Arias to me for evaluation. Below are my notes for this consultation.  If you have questions, please do not hesitate to call me. I look forward to following your patient along with you.       Sincerely,     Angeli Zelaya, APRN-CNP      CC: No Recipients  ______________________________________________________________________________________    Chief Complaint  \" Guess I am pretty decent\"    Reason for Visit  Annual follow-up  Patient presents to the office today for outpatient follow-up for coronary artery disease, A-fib, high risk med use.    Last evaluated in clinic by myself November 2023.    Presents today in motorized wheelchair.  He is bed ridden/wheelchair-bound at nursing home.  Accompanied by spouse    Wife reports a mechanical fall in December 2023 with hospitalization and discharged to nursing home.  He is now a resident at a local facility where he is bed ridden, reportedly gets up into the wheelchair for the majority of the day.    History of Present Illness   Patient is a pleasant 74-year-old gentleman who presents to the office today where he reports noticing some shortness of breath with conversation and feeling like he has\" no lung capacity\".  He sleeps with head of bed at 30 degrees.  There is no evidence of edema.  Daily activity is less than 4 METS.  At the nursing facility there have been no recurrent falls.  I believe he is wheelchair-bound due to diabetic neuropathy.  He denies any palpitations.  No PND.  On exam his lungs are clear, there is no evidence of progression of aortic stenosis murmur.  There is no noted dyspnea with conversation today.  Question if related to anxiety versus deconditioning.  Nonetheless, he is due for amiodarone surveillance " testing and we will just review chest x-ray.    Patient reports that overall has no complaint(s) of chest pain, chest pressure/discomfort, claudication, exertional chest pressure/discomfort, fatigue, and irregular heart beat    Daily activity:    Less than 4 METS  Denies any change in exercise capacity or functional tolerance since last office visit.    The importance of secondary prevention reviewed:  HTN: Optimal  HLD: Treated  DM: Treated, unknown hemoglobin A1c  Smoker: Denies  BMI:  Reviewed the merits of healthy lifestyle choices on overall cardiovascular health.    Fall screening reveals an occurrence over the course of the last year. Events were reviewed in detail with the patient and due to mechanical.     Conservative management.   EKG sinus rhythm with first-degree AV block, QTc 528.  This is consistent with prior EKGs.    Review of Systems   Cardiovascular:  Negative for chest pain, dyspnea on exertion, irregular heartbeat, leg swelling, near-syncope, orthopnea, palpitations, paroxysmal nocturnal dyspnea and syncope.   Respiratory:  Positive for shortness of breath.         Visit Vitals  /86 (BP Location: Right arm, Patient Position: Sitting)   Pulse 59   Ht 1.829 m (6')   Wt 102 kg (225 lb) Comment: last weight in   BMI 30.52 kg/m²   Smoking Status Never   BSA 2.28 m²     Physical Exam  Vitals and nursing note reviewed.   Constitutional:       Appearance: Normal appearance. He is obese.   Cardiovascular:      Rate and Rhythm: Normal rate and regular rhythm.      Heart sounds: Normal heart sounds.   Pulmonary:      Effort: Pulmonary effort is normal.      Breath sounds: Normal breath sounds.   Musculoskeletal:      Cervical back: Full passive range of motion without pain.      Right lower leg: No edema.      Left lower leg: No edema.   Skin:     General: Skin is cool.   Neurological:      Mental Status: He is alert and oriented to person, place, and time.   Psychiatric:         Attention and  Perception: Attention normal.         Mood and Affect: Mood normal.         Behavior: Behavior is cooperative.        Allergies   Allergen Reactions   • Ace Inhibitors Other     Hypertension, orthostatic    • Pollen Extracts Unknown     Current Outpatient Medications   Medication Instructions   • acetaminophen (TylenoL) 325 mg capsule As needed   • amiodarone (Pacerone) 200 mg tablet 1 tablet, Daily   • aspirin 81 mg EC tablet 1 tablet, Daily   • atorvastatin (LIPITOR) 80 mg, Daily   • clopidogrel (PLAVIX) 75 mg, Daily   • empagliflozin (JARDIANCE) 25 mg, Daily   • gabapentin (NEURONTIN) 600 mg, 2 times daily   • glipiZIDE XL (GLUCOTROL XL) 2.5 mg, Daily   • insulin glargine (Lantus U-100 Insulin) 100 unit/mL injection Use as directed   • sennosides-docusate sodium (Annamaria-Colace) 8.6-50 mg tablet 1 tablet, Daily   • sertraline (ZOLOFT) 25 mg, Daily      Assessment:    High risk medication use  Amiodarone  Surveillance testing completed March 2023  QTc in office 528 with underlying right bundle branch block consistent with history     Will arrange testing    Arteriosclerotic cardiovascular disease  2007 coronary bypass graft  BRITO to LAD  RCA 10%  Circumflex 10%     Current daily activity less than 4 METS without recurrent symptoms       Hyperlipidemia  High intensity statin    Orthostatic hypotension  Previously was maintained on ProAmatine  Presents off medication today.  For the most part he is bedridden and wheelchair-bound at nursing facility.    PAF (paroxysmal atrial fibrillation) (Multi)  EKG in office maintaining normal sinus rhythm on amiodarone  Denies any recurrent palpitations     CHADS VASc 3 with unfavorable risk-benefit ratio to anticoagulation due to frequent falls.  Maintained on DAPT and maintenance of normal sinus rhythm    Aortic valve replaced  2007 aortic valve replacement #25 Alvarez Perimount bioprosthetic valve due to bicuspid valve.     Last evaluated October 2022 TTE with peak 35 mean 24        Abnormal echocardiogram  October 2022 TTE  LVEF 60 to 65%  LVH moderate       BMI 30.0-30.9,adult  Weight down 20 pounds and relates to not liking food at NH    Nursing home resident  DNR-CC    Plan:     Through informed decision making process incorporating patients unique circumstances, the following treatment plan will be initiated:    1.  Prescription drug management of cardiovascular medication for efficacy, adherence to treatment, side effect assessment and polypharmacy. Current treatment clinically warranted and to continue without modifications.    2.  Amiodarone testing    3. Return for follow-up; in the interim, contact the office if new symptoms arise.  NP 9 months    Angeli Zelaya MSN, APRN-CNP, PMHNP-[facility]  [place], [place]     Please excuse any errors in grammar or translation related to this dictation. Voice recognition software was utilized to prepare this document.

## 2025-02-06 ENCOUNTER — OFFICE VISIT (OUTPATIENT)
Dept: GERIATRIC MEDICINE | Age: 75
End: 2025-02-06

## 2025-02-06 DIAGNOSIS — E11.42 TYPE 2 DIABETES MELLITUS WITH DIABETIC POLYNEUROPATHY, WITH LONG-TERM CURRENT USE OF INSULIN (HCC): ICD-10-CM

## 2025-02-06 DIAGNOSIS — Z79.4 TYPE 2 DIABETES MELLITUS WITH DIABETIC POLYNEUROPATHY, WITH LONG-TERM CURRENT USE OF INSULIN (HCC): ICD-10-CM

## 2025-02-06 DIAGNOSIS — N18.30 CHRONIC KIDNEY DISEASE (CKD), ACTIVE MEDICAL MANAGEMENT WITHOUT DIALYSIS, STAGE 3 (MODERATE) (HCC): ICD-10-CM

## 2025-02-06 DIAGNOSIS — I48.20 CHRONIC ATRIAL FIBRILLATION (HCC): Primary | ICD-10-CM

## 2025-02-06 DIAGNOSIS — I10 PRIMARY HYPERTENSION: ICD-10-CM

## 2025-02-13 ENCOUNTER — OFFICE VISIT (OUTPATIENT)
Dept: GERIATRIC MEDICINE | Age: 75
End: 2025-02-13

## 2025-02-13 DIAGNOSIS — M79.89 LEFT ARM SWELLING: Primary | ICD-10-CM

## 2025-02-20 ENCOUNTER — OFFICE VISIT (OUTPATIENT)
Dept: GERIATRIC MEDICINE | Age: 75
End: 2025-02-20

## 2025-02-20 DIAGNOSIS — R09.89 RALES: ICD-10-CM

## 2025-02-20 DIAGNOSIS — R06.02 MILD SHORTNESS OF BREATH: Primary | ICD-10-CM

## 2025-02-27 ENCOUNTER — OFFICE VISIT (OUTPATIENT)
Dept: GERIATRIC MEDICINE | Age: 75
End: 2025-02-27
Payer: MEDICARE

## 2025-02-27 DIAGNOSIS — R09.89 RALES: ICD-10-CM

## 2025-02-27 DIAGNOSIS — R09.89 SYMPTOM OF CONGESTIVE HEART FAILURE: Primary | ICD-10-CM

## 2025-02-27 DIAGNOSIS — R06.02 SOB (SHORTNESS OF BREATH): ICD-10-CM

## 2025-02-27 PROCEDURE — 99308 SBSQ NF CARE LOW MDM 20: CPT | Performed by: PHYSICIAN ASSISTANT

## 2025-02-27 PROCEDURE — 1123F ACP DISCUSS/DSCN MKR DOCD: CPT | Performed by: PHYSICIAN ASSISTANT

## 2025-03-06 ENCOUNTER — OFFICE VISIT (OUTPATIENT)
Dept: GERIATRIC MEDICINE | Age: 75
End: 2025-03-06

## 2025-03-06 DIAGNOSIS — N50.89 SWOLLEN SCROTUM: Primary | ICD-10-CM

## 2025-03-06 DIAGNOSIS — I50.9 CHRONIC CONGESTIVE HEART FAILURE, UNSPECIFIED HEART FAILURE TYPE (HCC): ICD-10-CM

## 2025-03-06 DIAGNOSIS — N49.2 CELLULITIS OF SCROTUM: ICD-10-CM

## 2025-03-17 ASSESSMENT — ENCOUNTER SYMPTOMS
COLOR CHANGE: 0
COUGH: 0
ABDOMINAL PAIN: 0
CONSTIPATION: 1
SHORTNESS OF BREATH: 0

## 2025-03-22 ASSESSMENT — ENCOUNTER SYMPTOMS
SHORTNESS OF BREATH: 1
COLOR CHANGE: 0
COUGH: 0
CHOKING: 0

## 2025-03-22 NOTE — PROGRESS NOTES
Subjective:      Patient ID: Agustin Kimball is a pleasant 74 y.o. male who presents today for:  Chief Complaint   Patient presents with    Other     Mild shortness of breath  (primary encounter diagnosis)  Rales         MEGHANN CRANE [facility]    Patient seen today for SOB and elevated BNP.  Most recent lab 1557.9 pg/mL, this is an slight increase from 1425 pg/mL about a week ago.  Patient is not on any routine diuretics at present.  Currently has some signs and symptoms of left side lower lung crackles, negative findings on the right side.  Does have history of a valve replacement however does not know which.  Will assess chart.  Will repeat BMP on Tuesday.  Patient states no new dyspnea or shortness of breath at rest.  Some mild orthopnea if lying perfectly flat however states his symptoms are basically unchanged compared to last visit.  Plan to continue monitoring and will add a routine diuretic as well if any further change in symptoms, however signs appear mild now.  Will monitor for time being.       Patient Active Problem List   Diagnosis    Type 2 diabetes mellitus with foot ulcer (HCC)    Hypertension    Chronic kidney disease (CKD), active medical management without dialysis, stage 3 (moderate) (HCC)    Atrial fibrillation (HCC)    ASHD (arteriosclerotic heart disease)    Depression    Constipation    Hyperlipidemia    Arteriosclerotic cardiovascular disease    Type 2 diabetes mellitus with diabetic polyneuropathy, with long-term current use of insulin (HCC)    PAF (paroxysmal atrial fibrillation) (HCC)    Backache     No past medical history on file.  No past surgical history on file.  Social History     Socioeconomic History    Marital status: Unknown     Spouse name: Not on file    Number of children: Not on file    Years of education: Not on file    Highest education level: Not on file   Occupational History    Not on file   Tobacco Use    Smoking status: Not on file    Smokeless tobacco: Not on file   Substance

## 2025-03-27 ASSESSMENT — ENCOUNTER SYMPTOMS
SHORTNESS OF BREATH: 1
CHOKING: 0
COUGH: 0
COLOR CHANGE: 0

## 2025-03-28 ENCOUNTER — OFFICE VISIT (OUTPATIENT)
Dept: GERIATRIC MEDICINE | Age: 75
End: 2025-03-28

## 2025-03-28 DIAGNOSIS — N50.89 SCROTAL SWELLING: Primary | ICD-10-CM

## 2025-03-28 DIAGNOSIS — R05.2 SUBACUTE COUGH: ICD-10-CM

## 2025-03-28 NOTE — PROGRESS NOTES
Subjective:      Patient ID: Agustin Kimball is a pleasant 74 y.o. male who presents today for:  Chief Complaint   Patient presents with    Other     Symptom of congestive heart failure  (primary encounter diagnosis)  Rales  Sob (shortness of breath)         [facility]    Patient seen today for monitoring of CHF symptoms.  Assisting patient is breathing today he is breathing well without any acute change BNP is slowly climbing last checked approximately 1820 pg/dL.  His vital signs are stable, see PCC.  Denies any acute dyspnea or orthopnea at present.  He has been having ongoing orthopnea at baseline however.  Does show bilateral upper arm increased edema as well as chronic lower leg dependent edema.  RRR, lungs sound showed diffuse very mild crackles to bases.  Will plan to increase Lasix and continue monitoring BMP/BMP in the next 2 weeks.        Patient Active Problem List   Diagnosis    Type 2 diabetes mellitus with foot ulcer ([facility])    Hypertension    Chronic kidney disease (CKD), active medical management without dialysis, stage 3 (moderate) (HCC)    Atrial fibrillation (HCC)    ASHD (arteriosclerotic heart disease)    Depression    Constipation    Hyperlipidemia    Arteriosclerotic cardiovascular disease    Type 2 diabetes mellitus with diabetic polyneuropathy, with long-term current use of insulin (HCC)    PAF (paroxysmal atrial fibrillation) ([facility])    Backache     No past medical history on file.  No past surgical history on file.  Social History     Socioeconomic History    Marital status: Unknown     Spouse name: Not on file    Number of children: Not on file    Years of education: Not on file    Highest education level: Not on file   Occupational History    Not on file   Tobacco Use    Smoking status: Not on file    Smokeless tobacco: Not on file   Substance and Sexual Activity    Alcohol use: Not on file    Drug use: Not on file    Sexual activity: Not on file   Other Topics Concern    Not on file

## 2025-04-03 ENCOUNTER — OFFICE VISIT (OUTPATIENT)
Dept: GERIATRIC MEDICINE | Age: 75
End: 2025-04-03

## 2025-04-03 DIAGNOSIS — E78.5 HYPERLIPIDEMIA, UNSPECIFIED HYPERLIPIDEMIA TYPE: ICD-10-CM

## 2025-04-03 DIAGNOSIS — E11.42 TYPE 2 DIABETES MELLITUS WITH DIABETIC POLYNEUROPATHY, WITH LONG-TERM CURRENT USE OF INSULIN (HCC): ICD-10-CM

## 2025-04-03 DIAGNOSIS — Z79.4 TYPE 2 DIABETES MELLITUS WITH DIABETIC POLYNEUROPATHY, WITH LONG-TERM CURRENT USE OF INSULIN (HCC): ICD-10-CM

## 2025-04-03 DIAGNOSIS — I48.0 PAF (PAROXYSMAL ATRIAL FIBRILLATION) (HCC): Primary | ICD-10-CM

## 2025-04-03 DIAGNOSIS — F32.A DEPRESSION, UNSPECIFIED DEPRESSION TYPE: ICD-10-CM

## 2025-04-03 DIAGNOSIS — I25.10 ARTERIOSCLEROTIC CARDIOVASCULAR DISEASE: ICD-10-CM

## 2025-04-03 DIAGNOSIS — K59.00 CONSTIPATION, UNSPECIFIED CONSTIPATION TYPE: ICD-10-CM

## 2025-04-03 DIAGNOSIS — I10 HYPERTENSION, UNSPECIFIED TYPE: ICD-10-CM

## 2025-04-03 DIAGNOSIS — N18.30 CHRONIC KIDNEY DISEASE (CKD), ACTIVE MEDICAL MANAGEMENT WITHOUT DIALYSIS, STAGE 3 (MODERATE) (HCC): ICD-10-CM

## 2025-04-07 ASSESSMENT — ENCOUNTER SYMPTOMS
CHOKING: 0
COUGH: 0
SHORTNESS OF BREATH: 1
COLOR CHANGE: 1

## 2025-04-11 ENCOUNTER — TELEPHONE (OUTPATIENT)
Dept: CARDIOLOGY | Facility: CLINIC | Age: 75
End: 2025-04-11
Payer: MEDICARE

## 2025-04-11 NOTE — TELEPHONE ENCOUNTER
----- Message from Angeli Zelaya sent at 4/10/2025  4:31 PM EDT -----  Please call SNF to make sure he is not having any symptoms of shortness of breath, orthopnea or PND.  ----- Message -----  From: Rae Ibarra  Sent: 4/10/2025   3:42 PM EDT  To: LORRAINE Goldstein-CNP

## 2025-04-11 NOTE — TELEPHONE ENCOUNTER
Phoned [facility] and spoke with nurse Kimberly to get update of any symptoms. State the only symptoms he currently has is left arm edema and increased weakness. Please advise.    To Dr. Madeleine Correia MD

## 2025-04-21 DIAGNOSIS — I48.0 PAF (PAROXYSMAL ATRIAL FIBRILLATION) (MULTI): ICD-10-CM

## 2025-04-21 NOTE — TELEPHONE ENCOUNTER
Phoned [facility] and spoke with AYESHA Cardona with new orders. Verbalized understanding.     To Angeli Sanchez NP

## 2025-04-21 NOTE — TELEPHONE ENCOUNTER
----- Message from Angeli Zelaya sent at 4/21/2025 12:45 PM EDT -----  His PFTs showed continued decline in lung function.  At this point, need to stop amiodarone.In past Dr. Tadeo avoided DOAC due to frequent falls - he is now w/c bound in SNF. Stop plavix, reduce ASA 81mg twice a weekBegin Eliquis 5mg twice a day  ----- Message -----  From: Rae Ibarra  Sent: 4/11/2025   8:18 AM EDT  To: LORRAINE Goldstein-CNP

## 2025-04-23 ASSESSMENT — ENCOUNTER SYMPTOMS
COUGH: 0
CHOKING: 0
SHORTNESS OF BREATH: 1

## 2025-04-24 ASSESSMENT — ENCOUNTER SYMPTOMS
COUGH: 0
CHOKING: 0
SHORTNESS OF BREATH: 1

## 2025-04-24 NOTE — PROGRESS NOTES
Subjective:      Patient ID: Agustin Kimball is a pleasant 74 y.o. male who presents today for:  Chief Complaint   Patient presents with    Other     Paf (paroxysmal atrial fibrillation) (hcc)  (primary encounter diagnosis)  Hypertension, unspecified type  Arteriosclerotic cardiovascular disease  Type 2 diabetes mellitus with diabetic polyneuropathy, with long-term current use of insulin (hcc)  Chronic kidney disease (ckd), active medical management without dialysis, stage 3 (moderate) (hcc)  Hyperlipidemia, unspecified hyperlipidemia type  Depression, unspecified depression type  Constipation, unspecified constipation type       [facility]    Reason for visit: Discharge summary report.    Subjective: Patient with history of CHF, CKD stage III, type 2 diabetes mellitus, A-fib, depression, constipation, and generalized weakness to be discharging to [facility]in [facility] on 4/7/2025.  Patient is moving to be closer to family.  Patient continues to have some ongoing scrotal swelling we are treating with burst duration diuretics to avoid over stressing his kidneys.  Has had some moderate improvement overall.  No new erythema or evidence of cellulitis to his scrotum.  He otherwise is prepared for discharge.  Continues to be mostly bedbound due to overall weakness.  Will send with current medication orders without any changes at the moment.  Will follow-up with PCP at new facility upon arrival.      Patient Active Problem List   Diagnosis    Type 2 diabetes mellitus with foot ulcer (HCC)    Hypertension    Chronic kidney disease (CKD), active medical management without dialysis, stage 3 (moderate) (HCC)    Atrial fibrillation (HCC)    ASHD (arteriosclerotic heart disease)    Depression    Constipation    Hyperlipidemia    Arteriosclerotic cardiovascular disease    Type 2 diabetes mellitus with diabetic polyneuropathy, with long-term current use of insulin (HCC)    PAF (paroxysmal atrial fibrillation) (HCC)

## 2025-04-24 NOTE — PROGRESS NOTES
on file   Food Insecurity: Not on file   Transportation Needs: Not on file   Physical Activity: Not on file   Stress: Not on file   Social Connections: Not on file   Intimate Partner Violence: Not on file   Housing Stability: Not on file     No family history on file.  No Known Allergies        Review of Systems   Constitutional:  Positive for fatigue (chronic). Negative for activity change, appetite change, fever and unexpected weight change.   Respiratory:  Positive for shortness of breath (mild). Negative for cough and choking.    Cardiovascular:  Positive for leg swelling. Negative for chest pain and palpitations.   Genitourinary:  Positive for scrotal swelling and testicular pain. Negative for difficulty urinating, dysuria, penile discharge, penile pain and penile swelling.   Neurological:  Positive for weakness.   Psychiatric/Behavioral:  Negative for agitation, confusion and sleep disturbance.        Objective:   VS: See PCC. Reviewed.    Physical Exam  Vitals (See PCC) reviewed.   Constitutional:       Appearance: He is normal weight.   HENT:      Head: Normocephalic and atraumatic.      Mouth/Throat:      Mouth: Mucous membranes are moist.      Pharynx: Oropharynx is clear.   Eyes:      Conjunctiva/sclera: Conjunctivae normal.   Cardiovascular:      Rate and Rhythm: Normal rate and regular rhythm.      Pulses: Normal pulses.      Heart sounds: Normal heart sounds.   Pulmonary:      Effort: Pulmonary effort is normal. No respiratory distress.      Breath sounds: No wheezing.   Musculoskeletal:      Right lower leg: Edema present.      Left lower leg: Edema present.      Comments: 1+ pitting LE edema (baseline finding)   Skin:     General: Skin is warm and dry.      Findings: Erythema (redness has decreased) present.   Neurological:      Mental Status: He is alert and oriented to person, place, and time. Mental status is at baseline.   Psychiatric:         Mood and Affect: Mood normal.         Behavior:

## 2025-11-03 ENCOUNTER — APPOINTMENT (OUTPATIENT)
Dept: CARDIOLOGY | Facility: CLINIC | Age: 75
End: 2025-11-03
Payer: MEDICARE